# Patient Record
Sex: FEMALE | Race: ASIAN | Employment: FULL TIME | URBAN - METROPOLITAN AREA
[De-identification: names, ages, dates, MRNs, and addresses within clinical notes are randomized per-mention and may not be internally consistent; named-entity substitution may affect disease eponyms.]

---

## 2018-04-10 ENCOUNTER — HOSPITAL ENCOUNTER (OUTPATIENT)
Dept: MAMMOGRAPHY | Age: 63
Discharge: HOME | End: 2018-04-10
Attending: OBSTETRICS & GYNECOLOGY
Payer: COMMERCIAL

## 2018-04-10 DIAGNOSIS — Z12.31 ENCOUNTER FOR SCREENING MAMMOGRAM FOR MALIGNANT NEOPLASM OF BREAST: ICD-10-CM

## 2018-04-10 PROCEDURE — 77063 BREAST TOMOSYNTHESIS BI: CPT

## 2018-04-12 ENCOUNTER — OFFICE VISIT (OUTPATIENT)
Dept: FAMILY MEDICINE CLINIC | Age: 63
End: 2018-04-12

## 2018-04-12 VITALS
WEIGHT: 147 LBS | HEART RATE: 80 BPM | HEIGHT: 60 IN | SYSTOLIC BLOOD PRESSURE: 130 MMHG | OXYGEN SATURATION: 97 % | DIASTOLIC BLOOD PRESSURE: 86 MMHG | TEMPERATURE: 98.6 F | BODY MASS INDEX: 28.86 KG/M2

## 2018-04-12 DIAGNOSIS — Z12.11 ENCOUNTER FOR SCREENING COLONOSCOPY: ICD-10-CM

## 2018-04-12 DIAGNOSIS — R20.0 NUMBNESS OF LEFT HAND: ICD-10-CM

## 2018-04-12 DIAGNOSIS — R20.2 POSITIVE PHALEN MANEUVER: ICD-10-CM

## 2018-04-12 DIAGNOSIS — R29.898 LEFT HAND WEAKNESS: Primary | ICD-10-CM

## 2018-04-12 DIAGNOSIS — R20.2 NUMBNESS AND TINGLING IN RIGHT HAND: ICD-10-CM

## 2018-04-12 DIAGNOSIS — R20.0 NUMBNESS AND TINGLING IN RIGHT HAND: ICD-10-CM

## 2018-04-12 PROBLEM — I10 ESSENTIAL HYPERTENSION: Status: ACTIVE | Noted: 2018-04-12

## 2018-04-12 PROBLEM — E78.5 HLD (HYPERLIPIDEMIA): Status: ACTIVE | Noted: 2018-04-12

## 2018-04-12 RX ORDER — NAPROXEN 500 MG/1
500 TABLET ORAL 2 TIMES DAILY WITH MEALS
Qty: 30 TAB | Refills: 0 | Status: SHIPPED | OUTPATIENT
Start: 2018-04-12 | End: 2019-01-15

## 2018-04-12 NOTE — PROGRESS NOTES
Sharee Cruz is a 58 y.o. female new pt establishing care and c/o numbness in fingers x about 5 weeks. This started after getting bunion surgery on R foot.      Previous PCP was [facility] on [address]    Learning assessment and PHQ2 completed    Last eye exam done by [facility]  Colonoscopy - Never

## 2018-04-12 NOTE — PATIENT INSTRUCTIONS
Carpal Tunnel Syndrome: Exercises  Your Care Instructions  Here are some examples of typical rehabilitation exercises for your condition. Start each exercise slowly. Ease off the exercise if you start to have pain. Your doctor or your physical or occupational therapist will tell you when you can start these exercises and which ones will work best for you. Warm-up stretches  When you no longer have pain or numbness, you can do exercises to help prevent carpal tunnel syndrome from coming back. Do not do any stretch or movement that is uncomfortable or painful. 1. Rotate your wrist up, down, and from side to side. Repeat 4 times. 2. Stretch your fingers far apart. Relax them, and then stretch them again. Repeat 4 times. 3. Stretch your thumb by pulling it back gently, holding it, and then releasing it. Repeat 4 times. How to do the exercises  Prayer stretch    1. Start with your palms together in front of your chest just below your chin. 2. Slowly lower your hands toward your waistline, keeping your hands close to your stomach and your palms together until you feel a mild to moderate stretch under your forearms. 3. Hold for at least 15 to 30 seconds. Repeat 2 to 4 times. Wrist flexor stretch    1. Extend your arm in front of you with your palm up. 2. Bend your wrist, pointing your hand toward the floor. 3. With your other hand, gently bend your wrist farther until you feel a mild to moderate stretch in your forearm. 4. Hold for at least 15 to 30 seconds. Repeat 2 to 4 times. Wrist extensor stretch    1. Repeat steps 1 through 4 of the stretch above, but begin with your extended hand palm down. Follow-up care is a key part of your treatment and safety. Be sure to make and go to all appointments, and call your doctor if you are having problems. It's also a good idea to know your test results and keep a list of the medicines you take. Where can you learn more?   Go to http://kiera-tom.info/. Enter E111 in the search box to learn more about \"Carpal Tunnel Syndrome: Exercises. \"  Current as of: March 21, 2017  Content Version: 11.4  © 8209-7998 Healthwise, Incorporated. Care instructions adapted under license by [employer] (which disclaims liability or warranty for this information). If you have questions about a medical condition or this instruction, always ask your healthcare professional. [place] [address] any warranty or liability for your use of this information.

## 2018-04-12 NOTE — PROGRESS NOTES
HISTORY OF PRESENT ILLNESS  Tracy Lopez is a 58 y.o. female. HPI  Tracy Lopez is a 58 y.o. female who presents to the office today for tingling. She is a new patient. Her previous PCP is [facility]. She has HTN and HLD on lisinopril and statin therapy. She comes in with c/o bilateral hand tingling, L>R. This started 5 weeks ago after she woke up from anesthesia for bunionectomy. This is constant. Nothing makes it better or worse. She has pain and weakness in the left hand. She is Right handed. She works as a merchant marine. She made herself an appt with Neurology for May. Chief Complaint   Patient presents with    Numbness     fingers       Current Outpatient Prescriptions on File Prior to Visit   Medication Sig Dispense Refill    lisinopril (PRINIVIL, ZESTRIL) 20 mg tablet Take 5 mg by mouth daily.  SIMVASTATIN PO Take 10 mg by mouth. No current facility-administered medications on file prior to visit. Allergies   Allergen Reactions    Latex Rash     Only prolonged use     Past Medical History:   Diagnosis Date    Hyperlipidemia     Hypertension     Menopause      History   Smoking Status    Never Smoker   Smokeless Tobacco    Never Used     History   Alcohol Use    2.4 oz/week    4 Glasses of wine per week     History reviewed. No pertinent family history. Review of Systems   Musculoskeletal: Positive for joint pain. Left hand/wrist   Neurological: Positive for tingling and focal weakness. Left hand     Visit Vitals    /86 (BP 1 Location: Left arm, BP Patient Position: Sitting)  Comment: manual    Pulse 80    Temp 98.6 °F (37 °C) (Oral)    Ht 5' (1.524 m)    Wt 147 lb (66.7 kg)    SpO2 97%    BMI 28.71 kg/m2     Physical Exam   Constitutional: She appears well-developed and well-nourished. No distress.    Musculoskeletal:        Right wrist: Normal.        Left wrist: Normal.        Right hand: Normal.        Left hand: Normal. + tinel's test bilaterally  + phalens test   Nursing note and vitals reviewed. ASSESSMENT and PLAN    ICD-10-CM ICD-9-CM    1. Left hand weakness R29.898 728.87 XR WRIST LT AP/LAT      naproxen (NAPROSYN) 500 mg tablet   2. Numbness of left hand R20.0 782.0 XR WRIST LT AP/LAT      naproxen (NAPROSYN) 500 mg tablet   3. Numbness and tingling in right hand R20.0 782.0 naproxen (NAPROSYN) 500 mg tablet    R20.2     4. Positive Phalen maneuver R68.89 796.4 naproxen (NAPROSYN) 500 mg tablet   5. Encounter for screening colonoscopy Z12.11 V76.51 OCCULT BLOOD, IMMUNOASSAY (FIT)      Xray of left wrist done today in the office. Based on hx and exam, likely carpal tunnel. Left wrist placed in brace since this is most affected. See Neurology next month. Will request records from [facility].   Reviewed medication and side effects. Patient agrees with the plan and verbalizes understanding. Follow-up Disposition:  Return in about 6 weeks (around 5/24/2018) for hand tingling .     Bryce Edmonds PA-C  4/12/2018

## 2018-04-18 ENCOUNTER — HOSPITAL ENCOUNTER (OUTPATIENT)
Dept: LAB | Age: 63
Discharge: HOME | End: 2018-04-18
Payer: COMMERCIAL

## 2018-04-18 DIAGNOSIS — Z12.11 ENCOUNTER FOR SCREENING COLONOSCOPY: ICD-10-CM

## 2018-04-18 PROCEDURE — 82274 ASSAY TEST FOR BLOOD FECAL: CPT | Performed by: PHYSICIAN ASSISTANT

## 2018-04-26 LAB — HEMOCCULT STL QL IA: NEGATIVE

## 2018-06-06 ENCOUNTER — OFFICE VISIT (OUTPATIENT)
Dept: FAMILY MEDICINE CLINIC | Age: 63
End: 2018-06-06

## 2018-06-06 ENCOUNTER — HOSPITAL ENCOUNTER (OUTPATIENT)
Dept: LAB | Age: 63
Discharge: HOME | End: 2018-06-06
Payer: COMMERCIAL

## 2018-06-06 VITALS
HEART RATE: 71 BPM | RESPIRATION RATE: 18 BRPM | BODY MASS INDEX: 29.41 KG/M2 | TEMPERATURE: 97.9 F | WEIGHT: 149.8 LBS | SYSTOLIC BLOOD PRESSURE: 124 MMHG | OXYGEN SATURATION: 97 % | HEIGHT: 60 IN | DIASTOLIC BLOOD PRESSURE: 74 MMHG

## 2018-06-06 DIAGNOSIS — E78.5 HYPERLIPIDEMIA, UNSPECIFIED HYPERLIPIDEMIA TYPE: ICD-10-CM

## 2018-06-06 DIAGNOSIS — I10 ESSENTIAL HYPERTENSION: ICD-10-CM

## 2018-06-06 DIAGNOSIS — Z00.00 ANNUAL PHYSICAL EXAM: Primary | ICD-10-CM

## 2018-06-06 DIAGNOSIS — Z00.00 ANNUAL PHYSICAL EXAM: ICD-10-CM

## 2018-06-06 LAB
ALBUMIN SERPL-MCNC: 4 G/DL (ref 3.4–5)
ALBUMIN/GLOB SERPL: 1.3 {RATIO} (ref 0.8–1.7)
ALP SERPL-CCNC: 61 U/L (ref 45–117)
ALT SERPL-CCNC: 28 U/L (ref 13–56)
ANION GAP SERPL CALC-SCNC: 10 MMOL/L (ref 3–18)
AST SERPL-CCNC: 26 U/L (ref 15–37)
BASOPHILS # BLD: 0 K/UL (ref 0–0.06)
BASOPHILS NFR BLD: 1 % (ref 0–2)
BILIRUB SERPL-MCNC: 0.4 MG/DL (ref 0.2–1)
BUN SERPL-MCNC: 17 MG/DL (ref 7–18)
BUN/CREAT SERPL: 15 (ref 12–20)
CALCIUM SERPL-MCNC: 9.3 MG/DL (ref 8.5–10.1)
CHLORIDE SERPL-SCNC: 109 MMOL/L (ref 100–108)
CHOLEST SERPL-MCNC: 182 MG/DL
CO2 SERPL-SCNC: 23 MMOL/L (ref 21–32)
CREAT SERPL-MCNC: 1.13 MG/DL (ref 0.6–1.3)
DIFFERENTIAL METHOD BLD: NORMAL
EOSINOPHIL # BLD: 0.2 K/UL (ref 0–0.4)
EOSINOPHIL NFR BLD: 4 % (ref 0–5)
ERYTHROCYTE [DISTWIDTH] IN BLOOD BY AUTOMATED COUNT: 12.8 % (ref 11.6–14.5)
GLOBULIN SER CALC-MCNC: 3.2 G/DL (ref 2–4)
GLUCOSE SERPL-MCNC: 93 MG/DL (ref 74–99)
HCT VFR BLD AUTO: 44 % (ref 35–45)
HDLC SERPL-MCNC: 59 MG/DL (ref 40–60)
HDLC SERPL: 3.1 {RATIO} (ref 0–5)
HGB BLD-MCNC: 14.5 G/DL (ref 12–16)
LDLC SERPL CALC-MCNC: 99.8 MG/DL (ref 0–100)
LIPID PROFILE,FLP: NORMAL
LYMPHOCYTES # BLD: 2.3 K/UL (ref 0.9–3.6)
LYMPHOCYTES NFR BLD: 34 % (ref 21–52)
MCH RBC QN AUTO: 29.8 PG (ref 24–34)
MCHC RBC AUTO-ENTMCNC: 33 G/DL (ref 31–37)
MCV RBC AUTO: 90.5 FL (ref 74–97)
MONOCYTES # BLD: 0.4 K/UL (ref 0.05–1.2)
MONOCYTES NFR BLD: 6 % (ref 3–10)
NEUTS SEG # BLD: 3.8 K/UL (ref 1.8–8)
NEUTS SEG NFR BLD: 55 % (ref 40–73)
PLATELET # BLD AUTO: 216 K/UL (ref 135–420)
PMV BLD AUTO: 10.3 FL (ref 9.2–11.8)
POTASSIUM SERPL-SCNC: 4.4 MMOL/L (ref 3.5–5.5)
PROT SERPL-MCNC: 7.2 G/DL (ref 6.4–8.2)
RBC # BLD AUTO: 4.86 M/UL (ref 4.2–5.3)
SODIUM SERPL-SCNC: 142 MMOL/L (ref 136–145)
TRIGL SERPL-MCNC: 116 MG/DL (ref ?–150)
TSH SERPL DL<=0.05 MIU/L-ACNC: 0.14 UIU/ML (ref 0.36–3.74)
VLDLC SERPL CALC-MCNC: 23.2 MG/DL
WBC # BLD AUTO: 6.7 K/UL (ref 4.6–13.2)

## 2018-06-06 PROCEDURE — 80061 LIPID PANEL: CPT | Performed by: PHYSICIAN ASSISTANT

## 2018-06-06 PROCEDURE — 80053 COMPREHEN METABOLIC PANEL: CPT | Performed by: PHYSICIAN ASSISTANT

## 2018-06-06 PROCEDURE — 84443 ASSAY THYROID STIM HORMONE: CPT | Performed by: PHYSICIAN ASSISTANT

## 2018-06-06 PROCEDURE — 85025 COMPLETE CBC W/AUTO DIFF WBC: CPT | Performed by: PHYSICIAN ASSISTANT

## 2018-06-06 RX ORDER — ASPIRIN 81 MG/1
TABLET ORAL DAILY
COMMUNITY

## 2018-06-06 RX ORDER — MULTIVIT WITH MINERALS/HERBS
1 TABLET ORAL DAILY
COMMUNITY

## 2018-06-06 NOTE — PROGRESS NOTES
Kendal Pillai is a 61 y.o. female here today for a CPE. Her only complaint is the numbness and tingling in her hands but she is seeing Neuro and PT for that. Her last pap was last year with Dr. Lionel Bray. 1. Have you been to the ER, urgent care clinic or hospitalized since your last visit? No    2. Have you seen or consulted any other health care providers outside of the [address] [place] [address] since your last visit (Include any pap smears or colon screening)? Went to Neuro and was then referred to PT    Do you have an Advanced Directive? No    Would you like information on Advanced Directives?  no

## 2018-06-06 NOTE — PATIENT INSTRUCTIONS
Body Mass Index: Care Instructions  Your Care Instructions    Body mass index (BMI) can help you see if your weight is raising your risk for health problems. It uses a formula to compare how much you weigh with how tall you are. · A BMI lower than 18.5 is considered underweight. · A BMI between 18.5 and 24.9 is considered healthy. · A BMI between 25 and 29.9 is considered overweight. A BMI of 30 or higher is considered obese. If your BMI is in the normal range, it means that you have a lower risk for weight-related health problems. If your BMI is in the overweight or obese range, you may be at increased risk for weight-related health problems, such as high blood pressure, heart disease, stroke, arthritis or joint pain, and diabetes. If your BMI is in the underweight range, you may be at increased risk for health problems such as fatigue, lower protection (immunity) against illness, muscle loss, bone loss, hair loss, and hormone problems. BMI is just one measure of your risk for weight-related health problems. You may be at higher risk for health problems if you are not active, you eat an unhealthy diet, or you drink too much alcohol or use tobacco products. Follow-up care is a key part of your treatment and safety. Be sure to make and go to all appointments, and call your doctor if you are having problems. It's also a good idea to know your test results and keep a list of the medicines you take. How can you care for yourself at home? · Practice healthy eating habits. This includes eating plenty of fruits, vegetables, whole grains, lean protein, and low-fat dairy. · If your doctor recommends it, get more exercise. Walking is a good choice. Bit by bit, increase the amount you walk every day. Try for at least 30 minutes on most days of the week. · Do not smoke. Smoking can increase your risk for health problems. If you need help quitting, talk to your doctor about stop-smoking programs and medicines. These can increase your chances of quitting for good. · Limit alcohol to 2 drinks a day for men and 1 drink a day for women. Too much alcohol can cause health problems. If you have a BMI higher than 25  · Your doctor may do other tests to check your risk for weight-related health problems. This may include measuring the distance around your waist. A waist measurement of more than 40 inches in men or 35 inches in women can increase the risk of weight-related health problems. · Talk with your doctor about steps you can take to stay healthy or improve your health. You may need to make lifestyle changes to lose weight and stay healthy, such as changing your diet and getting regular exercise. If you have a BMI lower than 18.5  · Your doctor may do other tests to check your risk for health problems. · Talk with your doctor about steps you can take to stay healthy or improve your health. You may need to make lifestyle changes to gain or maintain weight and stay healthy, such as getting more healthy foods in your diet and doing exercises to build muscle. Where can you learn more? Go to http://kiera-tom.info/. Enter S176 in the search box to learn more about \"Body Mass Index: Care Instructions. \"  Current as of: October 13, 2016  Content Version: 11.4  © 5650-5382 Healthwise, Incorporated. Care instructions adapted under license by [employer] (which disclaims liability or warranty for this information). If you have questions about a medical condition or this instruction, always ask your healthcare professional. [place] [address] any warranty or liability for your use of this information. Well Visit, Women 48 to 72: Care Instructions  Your Care Instructions    Physical exams can help you stay healthy. Your doctor has checked your overall health and may have suggested ways to take good care of yourself. He or she also may have recommended tests.  At home, you can help prevent illness with healthy eating, regular exercise, and other steps. Follow-up care is a key part of your treatment and safety. Be sure to make and go to all appointments, and call your doctor if you are having problems. It's also a good idea to know your test results and keep a list of the medicines you take. How can you care for yourself at home? · Reach and stay at a healthy weight. This will lower your risk for many problems, such as obesity, diabetes, heart disease, and high blood pressure. · Get at least 30 minutes of exercise on most days of the week. Walking is a good choice. You also may want to do other activities, such as running, swimming, cycling, or playing tennis or team sports. · Do not smoke. Smoking can make health problems worse. If you need help quitting, talk to your doctor about stop-smoking programs and medicines. These can increase your chances of quitting for good. · Protect your skin from too much sun. When you're outdoors from 10 a.m. to 4 p.m., stay in the shade or cover up with clothing and a hat with a wide brim. Wear sunglasses that block UV rays. Even when it's cloudy, put broad-spectrum sunscreen (SPF 30 or higher) on any exposed skin. · See a dentist one or two times a year for checkups and to have your teeth cleaned. · Wear a seat belt in the car. · Limit alcohol to 1 drink a day. Too much alcohol can cause health problems. Follow your doctor's advice about when to have certain tests. These tests can spot problems early. · Cholesterol. Your doctor will tell you how often to have this done based on your age, family history, or other things that can increase your risk for heart attack and stroke. · Blood pressure. Have your blood pressure checked during a routine doctor visit. Your doctor will tell you how often to check your blood pressure based on your age, your blood pressure results, and other factors.   · Mammogram. Ask your doctor how often you should have a mammogram, which is an X-ray of your breasts. A mammogram can spot breast cancer before it can be felt and when it is easiest to treat. · Pap test and pelvic exam. Ask your doctor how often you should have a Pap test. You may not need to have a Pap test as often as you used to. · Vision. Have your eyes checked every year or two or as often as your doctor suggests. Some experts recommend that you have yearly exams for glaucoma and other age-related eye problems starting at age 48. · Hearing. Tell your doctor if you notice any change in your hearing. You can have tests to find out how well you hear. · Diabetes. Ask your doctor whether you should have tests for diabetes. · Colon cancer. You should begin tests for colon cancer at age 48. You may have one of several tests. Your doctor will tell you how often to have tests based on your age and risk. Risks include whether you already had a precancerous polyp removed from your colon or whether your parents, sisters and brothers, or children have had colon cancer. · Thyroid disease. Talk to your doctor about whether to have your thyroid checked as part of a regular physical exam. Women have an increased chance of a thyroid problem. · Osteoporosis. You should begin tests for bone density at age 72. If you are younger than 72, ask your doctor whether you have factors that may increase your risk for this disease. You may want to have this test before age 72. · Heart attack and stroke risk. At least every 4 to 6 years, you should have your risk for heart attack and stroke assessed. Your doctor uses factors such as your age, blood pressure, cholesterol, and whether you smoke or have diabetes to show what your risk for a heart attack or stroke is over the next 10 years. When should you call for help? Watch closely for changes in your health, and be sure to contact your doctor if you have any problems or symptoms that concern you. Where can you learn more?   Go to http://kiera-tom.info/. Enter G079 in the search box to learn more about \"Well Visit, Women 50 to 72: Care Instructions. \"  Current as of: May 12, 2017  Content Version: 11.4  © 4134-5661 Healthwise, Incorporated. Care instructions adapted under license by [employer] (which disclaims liability or warranty for this information). If you have questions about a medical condition or this instruction, always ask your healthcare professional. [place] [address] any warranty or liability for your use of this information.

## 2018-06-06 NOTE — PROGRESS NOTES
Subjective:   61 y.o. female for Well Woman Check. Her gyne and breast care is done elsewhere by her Ob-Gyne physician- Dr. Shante Azevedo last year. Mammogram was done in April and on file- one year follow up. She had Hep C screening done already through work- [employer]. She has the copy of the report at home. Sees Dr. Ambrose Newton for multinodular goiter- nodules were stable on US in April- one year follow up. She is seeing Neurology and PT for her bilateral hand tingling and weakness. Says there is a pinched nerve in her neck and PT is working on this. She has seen no improvement yet. Patient Active Problem List   Diagnosis Code    HLD (hyperlipidemia) E78.5    Essential hypertension I10     Patient Active Problem List    Diagnosis Date Noted    HLD (hyperlipidemia) 04/12/2018    Essential hypertension 04/12/2018     Current Outpatient Prescriptions   Medication Sig Dispense Refill    aspirin delayed-release 81 mg tablet Take  by mouth daily.  b complex vitamins tablet Take 1 Tab by mouth daily.  naproxen (NAPROSYN) 500 mg tablet Take 1 Tab by mouth two (2) times daily (with meals). Indications: Pain 30 Tab 0    lisinopril (PRINIVIL, ZESTRIL) 20 mg tablet Take 5 mg by mouth daily.  SIMVASTATIN PO Take 10 mg by mouth. Allergies   Allergen Reactions    Latex Rash     Only prolonged use     Past Medical History:   Diagnosis Date    Hyperlipidemia     Hypertension     Menopause      Past Surgical History:   Procedure Laterality Date    HX BUNIONECTOMY Right     HX MYRINGOTOMY      HX TUBAL LIGATION       No family history on file. Social History   Substance Use Topics    Smoking status: Never Smoker    Smokeless tobacco: Never Used    Alcohol use 2.4 oz/week     4 Glasses of wine per week        ROS: Feeling generally well. No TIA's or unusual headaches, no dysphagia. No prolonged cough. No dyspnea or chest pain on exertion.   No abdominal pain, change in bowel habits, black or bloody stools. No urinary tract symptoms. No new or unusual musculoskeletal symptoms. Pertinent positives mentioned in HPI    Specific concerns today: None. Objective: The patient appears well, alert, oriented x 3, in no distress. Visit Vitals    /74 (BP 1 Location: Right arm, BP Patient Position: Sitting)    Pulse 71    Temp 97.9 °F (36.6 °C) (Oral)    Resp 18    Ht 5' (1.524 m)    Wt 149 lb 12.8 oz (67.9 kg)    SpO2 97%    BMI 29.26 kg/m2     ENT normal. Neck supple. No adenopathy or thyromegaly. ALIZE. Wears eye glasses. Lungs are clear, good air entry, no wheezes, rhonchi or rales. S1 and S2 normal, no murmurs, regular rate and rhythm. Abdomen soft without tenderness, guarding, mass or organomegaly. Extremities show no edema, normal peripheral pulses. Neurological is normal, no focal findings. Normal gait. Patellar reflexes 2+ bilaterally. Psych- mood and affect normal. Breast and Pelvic exams are deferred. Assessment/Plan:       ICD-10-CM ICD-9-CM    1. Annual physical exam Z00.00 V70.0 aspirin delayed-release 81 mg tablet      b complex vitamins tablet      CBC WITH AUTOMATED DIFF      METABOLIC PANEL, COMPREHENSIVE      LIPID PANEL      TSH 3RD GENERATION   2. Hyperlipidemia, unspecified hyperlipidemia type E78.5 272.4 aspirin delayed-release 81 mg tablet      b complex vitamins tablet      CBC WITH AUTOMATED DIFF      METABOLIC PANEL, COMPREHENSIVE      LIPID PANEL      TSH 3RD GENERATION   3. Essential hypertension I10 401.9 aspirin delayed-release 81 mg tablet      b complex vitamins tablet      CBC WITH AUTOMATED DIFF      METABOLIC PANEL, COMPREHENSIVE      LIPID PANEL      TSH 3RD GENERATION        Fasting labs done today. Will request PAP from GYN and update . She will bring in copy of Hepatitis C screening.  says she is due for [address] but she does not have medicare. Will try to get this removed. No change to medication regimen.  Patient agrees with plan and verbalizes understanding. Follow-up Disposition:  Return in about 6 months (around 12/6/2018) for HTN, HLD. Tuan Adam PA-C  06/06/18      Discussed the patient's BMI with her. The BMI follow up plan is as follows:     dietary management education, guidance, and counseling  encourage exercise  monitor weight  prescribed dietary intake    An After Visit Summary was printed and given to the patient.

## 2018-06-08 NOTE — PROGRESS NOTES
CBC normal. Lipids look good. Kidney function a little low. Will recheck in 4-6 weeks. Her TSH is low. Please have her call Dr. Dennis Roy and inform of this result.

## 2018-06-08 NOTE — PROGRESS NOTES
Spoke to pt, given lab result, pt voiced understanding.  I will fax results to Dr. Jose Guadalupe Hackett

## 2018-11-05 NOTE — TELEPHONE ENCOUNTER
erroneous encounter   Patient needed to make an appointment [place] needing a 6 month supply of medication

## 2018-11-16 ENCOUNTER — OFFICE VISIT (OUTPATIENT)
Dept: FAMILY MEDICINE CLINIC | Age: 63
End: 2018-11-16

## 2018-11-16 ENCOUNTER — HOSPITAL ENCOUNTER (OUTPATIENT)
Dept: LAB | Age: 63
Discharge: HOME | End: 2018-11-16
Payer: COMMERCIAL

## 2018-11-16 VITALS
WEIGHT: 143.6 LBS | BODY MASS INDEX: 28.19 KG/M2 | OXYGEN SATURATION: 97 % | HEART RATE: 96 BPM | SYSTOLIC BLOOD PRESSURE: 136 MMHG | DIASTOLIC BLOOD PRESSURE: 73 MMHG | HEIGHT: 60 IN | RESPIRATION RATE: 18 BRPM | TEMPERATURE: 98.6 F

## 2018-11-16 DIAGNOSIS — R20.2 PARESTHESIA OF BOTH HANDS: ICD-10-CM

## 2018-11-16 DIAGNOSIS — E78.5 HYPERLIPIDEMIA, UNSPECIFIED HYPERLIPIDEMIA TYPE: ICD-10-CM

## 2018-11-16 DIAGNOSIS — G89.29 CHRONIC NONINTRACTABLE HEADACHE, UNSPECIFIED HEADACHE TYPE: ICD-10-CM

## 2018-11-16 DIAGNOSIS — R79.89 ABNORMAL TSH: ICD-10-CM

## 2018-11-16 DIAGNOSIS — I10 ESSENTIAL HYPERTENSION: Primary | ICD-10-CM

## 2018-11-16 DIAGNOSIS — I10 ESSENTIAL HYPERTENSION: ICD-10-CM

## 2018-11-16 DIAGNOSIS — R53.82 CHRONIC FATIGUE: ICD-10-CM

## 2018-11-16 DIAGNOSIS — R51.9 CHRONIC NONINTRACTABLE HEADACHE, UNSPECIFIED HEADACHE TYPE: ICD-10-CM

## 2018-11-16 DIAGNOSIS — R06.83 SNORING: ICD-10-CM

## 2018-11-16 LAB
ALBUMIN SERPL-MCNC: 3.7 G/DL (ref 3.4–5)
ALBUMIN/GLOB SERPL: 1 {RATIO} (ref 0.8–1.7)
ALP SERPL-CCNC: 73 U/L (ref 45–117)
ALT SERPL-CCNC: 38 U/L (ref 13–56)
ANION GAP SERPL CALC-SCNC: 8 MMOL/L (ref 3–18)
AST SERPL-CCNC: 26 U/L (ref 15–37)
BASOPHILS # BLD: 0 K/UL (ref 0–0.1)
BASOPHILS NFR BLD: 1 % (ref 0–2)
BILIRUB SERPL-MCNC: 0.4 MG/DL (ref 0.2–1)
BUN SERPL-MCNC: 17 MG/DL (ref 7–18)
BUN/CREAT SERPL: 14 (ref 12–20)
CALCIUM SERPL-MCNC: 9.4 MG/DL (ref 8.5–10.1)
CHLORIDE SERPL-SCNC: 107 MMOL/L (ref 100–108)
CO2 SERPL-SCNC: 27 MMOL/L (ref 21–32)
CREAT SERPL-MCNC: 1.19 MG/DL (ref 0.6–1.3)
DIFFERENTIAL METHOD BLD: ABNORMAL
EOSINOPHIL # BLD: 0.2 K/UL (ref 0–0.4)
EOSINOPHIL NFR BLD: 3 % (ref 0–5)
ERYTHROCYTE [DISTWIDTH] IN BLOOD BY AUTOMATED COUNT: 12.6 % (ref 11.6–14.5)
GLOBULIN SER CALC-MCNC: 3.6 G/DL (ref 2–4)
GLUCOSE SERPL-MCNC: 139 MG/DL (ref 74–99)
HCT VFR BLD AUTO: 45.3 % (ref 35–45)
HGB BLD-MCNC: 14.9 G/DL (ref 12–16)
LYMPHOCYTES # BLD: 1.8 K/UL (ref 0.9–3.6)
LYMPHOCYTES NFR BLD: 29 % (ref 21–52)
MCH RBC QN AUTO: 30 PG (ref 24–34)
MCHC RBC AUTO-ENTMCNC: 32.9 G/DL (ref 31–37)
MCV RBC AUTO: 91.1 FL (ref 74–97)
MONOCYTES # BLD: 0.3 K/UL (ref 0.05–1.2)
MONOCYTES NFR BLD: 5 % (ref 3–10)
NEUTS SEG # BLD: 4 K/UL (ref 1.8–8)
NEUTS SEG NFR BLD: 62 % (ref 40–73)
PLATELET # BLD AUTO: 241 K/UL (ref 135–420)
PMV BLD AUTO: 9.9 FL (ref 9.2–11.8)
POTASSIUM SERPL-SCNC: 3.9 MMOL/L (ref 3.5–5.5)
PROT SERPL-MCNC: 7.3 G/DL (ref 6.4–8.2)
RBC # BLD AUTO: 4.97 M/UL (ref 4.2–5.3)
SODIUM SERPL-SCNC: 142 MMOL/L (ref 136–145)
T4 FREE SERPL-MCNC: 1.2 NG/DL (ref 0.7–1.5)
TSH SERPL DL<=0.05 MIU/L-ACNC: 0.16 UIU/ML (ref 0.36–3.74)
WBC # BLD AUTO: 6.3 K/UL (ref 4.6–13.2)

## 2018-11-16 PROCEDURE — 84439 ASSAY OF FREE THYROXINE: CPT

## 2018-11-16 PROCEDURE — 84480 ASSAY TRIIODOTHYRONINE (T3): CPT

## 2018-11-16 PROCEDURE — 80053 COMPREHEN METABOLIC PANEL: CPT

## 2018-11-16 PROCEDURE — 82306 VITAMIN D 25 HYDROXY: CPT

## 2018-11-16 PROCEDURE — 85025 COMPLETE CBC W/AUTO DIFF WBC: CPT

## 2018-11-16 RX ORDER — DULOXETIN HYDROCHLORIDE 30 MG/1
30 CAPSULE, DELAYED RELEASE ORAL 2 TIMES DAILY
COMMUNITY
End: 2019-01-15

## 2018-11-16 RX ORDER — LISINOPRIL 5 MG/1
5 TABLET ORAL DAILY
Qty: 180 TAB | Refills: 1 | Status: SHIPPED | OUTPATIENT
Start: 2018-11-16 | End: 2019-01-15 | Stop reason: SINTOL

## 2018-11-16 RX ORDER — NITROFURANTOIN 25; 75 MG/1; MG/1
100 CAPSULE ORAL 2 TIMES DAILY
COMMUNITY
End: 2019-01-15 | Stop reason: ALTCHOICE

## 2018-11-16 RX ORDER — SIMVASTATIN 20 MG/1
20 TABLET, FILM COATED ORAL
Qty: 180 TAB | Refills: 1 | Status: SHIPPED | OUTPATIENT
Start: 2018-11-16 | End: 2019-07-25 | Stop reason: SDUPTHER

## 2018-11-16 NOTE — PROGRESS NOTES
Jodi Brantley is a 61 y.o. female here this morning for a refill on her Simvastatin and Lisinopril. On March 6, 2018 she had a bunionectomy. When she woke up, her finger tips were tingling and she was referred to Neuro. She was told she needed 3 steroid injections which haven't helped and now both hands are affected.

## 2018-11-16 NOTE — PROGRESS NOTES
HISTORY OF PRESENT ILLNESS  Anna Rod is a 61 y.o. female. HPI  Anna Rod is a 61 y.o. female who presents to the office today for refills. She comes in for medication refills. She will be leaving for a work trip x 6 months starting on Monday. She has been seeing Dr. Erasto Ferrera with Neurology since she developed tingling and numbness in both hands after her bunion surgery earlier this year. She was told she had a pinched nerve in the C-spine and was given a series of 3 steroid injections. She says this has not helped. She has not been working for about 8 months, and now has to return or she will be \"forced\" to retire. She says she has been cleared Fit for Duty by Neuro. She mentions having chronic HAs for the past 4 months. She points to the top of the head and says when she touches the scalp it is sensitive. There is sometimes nausea. She had her vision checked earlier this year- wears eye glasses. She has no hx of headaches or migraines in the past. She will take Ibuprofen, sleep, and this typically works well for her. Chief Complaint   Patient presents with    Medication Refill       Current Outpatient Medications on File Prior to Visit   Medication Sig Dispense Refill    DULoxetine (CYMBALTA) 30 mg capsule Take 30 mg by mouth two (2) times a day.  nitrofurantoin, macrocrystal-monohydrate, (MACROBID) 100 mg capsule Take 100 mg by mouth two (2) times a day.  aspirin delayed-release 81 mg tablet Take  by mouth daily.  b complex vitamins tablet Take 1 Tab by mouth daily.  naproxen (NAPROSYN) 500 mg tablet Take 1 Tab by mouth two (2) times daily (with meals). Indications: Pain 30 Tab 0     No current facility-administered medications on file prior to visit.       Allergies   Allergen Reactions    Latex Rash     Only prolonged use     Past Medical History:   Diagnosis Date    Hyperlipidemia     Hypertension     Menopause      Social History     Tobacco Use Smoking Status Never Smoker   Smokeless Tobacco Never Used     Social History     Substance and Sexual Activity   Alcohol Use Yes    Alcohol/week: 2.4 oz    Types: 4 Glasses of wine per week     No family history on file. Review of Systems   Constitutional: Positive for malaise/fatigue and weight loss. Negative for diaphoresis and fever. Eyes: Negative for blurred vision, double vision and pain. Respiratory: Negative for shortness of breath. Cardiovascular: Negative for chest pain, palpitations and leg swelling. Gastrointestinal: Positive for nausea. Negative for abdominal pain. Genitourinary:        Currently being treated for UTI by GYN   Musculoskeletal: Negative for falls. Neurological: Positive for tingling and headaches. Negative for dizziness. Endo/Heme/Allergies: Does not bruise/bleed easily. Psychiatric/Behavioral: Negative for depression. The patient is not nervous/anxious. Visit Vitals  /73 (BP 1 Location: Right arm, BP Patient Position: Sitting)   Pulse 96   Temp 98.6 °F (37 °C) (Oral)   Resp 18   Ht 5' (1.524 m)   Wt 143 lb 9.6 oz (65.1 kg)   SpO2 97%   BMI 28.04 kg/m²     Physical Exam   Constitutional: She is oriented to person, place, and time. She appears well-developed and well-nourished. No distress. Eyes: Conjunctivae, EOM and lids are normal. Pupils are equal, round, and reactive to light. Neck: Neck supple. No thyromegaly present. Cardiovascular: Normal rate, regular rhythm and normal heart sounds. Pulmonary/Chest: Effort normal and breath sounds normal. No respiratory distress. Musculoskeletal: She exhibits no edema. Neurological: She is alert and oriented to person, place, and time. Psychiatric: She has a normal mood and affect. Her speech is normal and behavior is normal. Thought content normal.   Nursing note and vitals reviewed. ASSESSMENT and PLAN    ICD-10-CM ICD-9-CM    1.  Essential hypertension I10 401.9 CBC WITH AUTOMATED DIFF METABOLIC PANEL, COMPREHENSIVE      TSH AND FREE T4      T3 TOTAL      lisinopril (PRINIVIL, ZESTRIL) 5 mg tablet   2. Chronic nonintractable headache, unspecified headache type R51 784.0 CBC WITH AUTOMATED DIFF      METABOLIC PANEL, COMPREHENSIVE      TSH AND FREE T4      T3 TOTAL      VITAMIN D, 25 HYDROXY   3. Chronic fatigue R53.82 780.79 CBC WITH AUTOMATED DIFF      METABOLIC PANEL, COMPREHENSIVE      TSH AND FREE T4      T3 TOTAL   4. Paresthesia of both hands R20.2 782.0 CBC WITH AUTOMATED DIFF      METABOLIC PANEL, COMPREHENSIVE      TSH AND FREE T4      T3 TOTAL      VITAMIN D, 25 HYDROXY   5. Abnormal TSH R79.89 790.6    6. Hyperlipidemia, unspecified hyperlipidemia type E78.5 272.4 simvastatin (ZOCOR) 20 mg tablet   7. Snoring R06.83 786.09       BP controlled. Refilled lisinopril and zocor for 6 months since she is leaving Monday. Will check labs today. If thyroid levels are abnormal, she says she can have this monitored closely by a Marvin Higginbotham. I have suggested she have a sleep study to r/o sleep apnea. Also possible scan of the brain given new onset headaches. She wants to wait until she returns from her work trip to get any studies done. Reviewed medication and side effects. Patient agrees with the plan and verbalizes understanding. Follow-up Disposition:  Return in about 6 months (around 5/16/2019) for headaches, HTN, HLD.     Cholo Waddell PA-C  11/16/2018

## 2018-11-16 NOTE — PATIENT INSTRUCTIONS
Numbness and Tingling: Care Instructions  Your Care Instructions    Many things can cause numbness or tingling. Swelling may put pressure on a nerve. This could cause you to lose feeling or have a pins-and-needles sensation on part of your body. Nerves may be damaged from trauma, toxins, or diseases, such as diabetes or multiple sclerosis (MS). Sometimes, though, the cause is not clear. If there is no clear reason for your symptoms, and you are not having any other symptoms, your doctor may suggest watching and waiting for a while to see if the numbness or tingling goes away on its own. Your doctor may want you to have blood or nerve tests to find the cause of your symptoms. Follow-up care is a key part of your treatment and safety. Be sure to make and go to all appointments, and call your doctor if you are having problems. It's also a good idea to know your test results and keep a list of the medicines you take. How can you care for yourself at home? · If your doctor prescribes medicine, take it exactly as directed. Call your doctor if you think you are having a problem with your medicine. · If you have any swelling, put ice or a cold pack on the area for 10 to 20 minutes at a time. Put a thin cloth between the ice and your skin. When should you call for help? Call 911 anytime you think you may need emergency care. For example, call if:    · You have weakness, numbness, or tingling in both legs.     · You lose bowel or bladder control.     · You have symptoms of a stroke. These may include:  ? Sudden numbness, tingling, weakness, or loss of movement in your face, arm, or leg, especially on only one side of your body. ? Sudden vision changes. ? Sudden trouble speaking. ? Sudden confusion or trouble understanding simple statements. ? Sudden problems with walking or balance.   ? A sudden, severe headache that is different from past headaches.    Watch closely for changes in your health, and be sure to contact your doctor if you have any problems, or if:    · You do not get better as expected. Where can you learn more? Go to http://kiera-tom.info/. Enter S382 in the search box to learn more about \"Numbness and Tingling: Care Instructions. \"  Current as of: September 10, 2017  Content Version: 11.8  © 6128-4417 [employer]. Care instructions adapted under license by [employer] (which disclaims liability or warranty for this information). If you have questions about a medical condition or this instruction, always ask your healthcare professional. [place] [address] any warranty or liability for your use of this information.

## 2018-11-17 LAB
25(OH)D3 SERPL-MCNC: 25.4 NG/ML (ref 30–100)
T3 SERPL-MCNC: 111 NG/DL (ref 71–180)

## 2018-11-19 ENCOUNTER — DOCUMENTATION ONLY (OUTPATIENT)
Dept: FAMILY MEDICINE CLINIC | Age: 63
End: 2018-11-19

## 2018-11-19 NOTE — PROGRESS NOTES
Labs ordered by [facility] were faxed to their office at [phone].

## 2018-11-20 ENCOUNTER — TELEPHONE (OUTPATIENT)
Dept: FAMILY MEDICINE CLINIC | Age: 63
End: 2018-11-20

## 2018-11-20 DIAGNOSIS — E78.5 HYPERLIPIDEMIA, UNSPECIFIED HYPERLIPIDEMIA TYPE: Primary | ICD-10-CM

## 2018-11-20 NOTE — PROGRESS NOTES
Vitamin D is low. Daily replacement with 9228-0052 units will be good. TSH is still on the lower end, with normal T3 and T4. I would suggest she have this rechecked in 4-6 weeks with one of the [employer]. Looks like Neuro labs were sent to their office.

## 2018-11-20 NOTE — TELEPHONE ENCOUNTER
Patient is requesting to have her Lipid ordered. Patient will be in the office tomorrow morning for the draw.      Please advise

## 2018-11-21 ENCOUNTER — HOSPITAL ENCOUNTER (OUTPATIENT)
Dept: LAB | Age: 63
Discharge: HOME | End: 2018-11-21
Payer: COMMERCIAL

## 2018-11-21 ENCOUNTER — CLINICAL SUPPORT (OUTPATIENT)
Dept: FAMILY MEDICINE CLINIC | Age: 63
End: 2018-11-21

## 2018-11-21 DIAGNOSIS — E78.5 HYPERLIPIDEMIA, UNSPECIFIED HYPERLIPIDEMIA TYPE: Primary | ICD-10-CM

## 2018-11-21 DIAGNOSIS — E78.5 HYPERLIPIDEMIA, UNSPECIFIED HYPERLIPIDEMIA TYPE: ICD-10-CM

## 2018-11-21 LAB
CHOLEST SERPL-MCNC: 186 MG/DL
HDLC SERPL-MCNC: 62 MG/DL (ref 40–60)
HDLC SERPL: 3 {RATIO} (ref 0–5)
LDLC SERPL CALC-MCNC: 93.2 MG/DL (ref 0–100)
LIPID PROFILE,FLP: ABNORMAL
TRIGL SERPL-MCNC: 154 MG/DL (ref ?–150)
VLDLC SERPL CALC-MCNC: 30.8 MG/DL

## 2018-11-21 PROCEDURE — 80061 LIPID PANEL: CPT

## 2018-11-21 NOTE — PROGRESS NOTES
Aleksandar Cherry is a 61 y.o. female here this morning to have her lipids checked. She left a form for her job to be filled out once results come back in. She tolerated blood draw well and left showing no s/s of distress.

## 2018-11-23 ENCOUNTER — DOCUMENTATION ONLY (OUTPATIENT)
Dept: FAMILY MEDICINE CLINIC | Age: 63
End: 2018-11-23

## 2018-11-23 NOTE — PROGRESS NOTES
Called patient to make her aware that her paper work for her job has been completed and will be left up front for her to  at her earliest convenience. She voiced understanding.

## 2018-11-27 ENCOUNTER — TELEPHONE (OUTPATIENT)
Dept: FAMILY MEDICINE CLINIC | Age: 63
End: 2018-11-27

## 2018-11-27 DIAGNOSIS — R06.83 SNORING: Primary | ICD-10-CM

## 2018-11-27 NOTE — TELEPHONE ENCOUNTER
Patient was told by her job that she isn't fit for duty until she has a sleep study.  PLease send referral.

## 2019-01-14 ENCOUNTER — TELEPHONE (OUTPATIENT)
Dept: FAMILY MEDICINE CLINIC | Age: 64
End: 2019-01-14

## 2019-01-15 ENCOUNTER — OFFICE VISIT (OUTPATIENT)
Dept: FAMILY MEDICINE CLINIC | Age: 64
End: 2019-01-15

## 2019-01-15 VITALS
RESPIRATION RATE: 18 BRPM | BODY MASS INDEX: 29.06 KG/M2 | SYSTOLIC BLOOD PRESSURE: 141 MMHG | HEIGHT: 60 IN | DIASTOLIC BLOOD PRESSURE: 87 MMHG | TEMPERATURE: 99.1 F | WEIGHT: 148 LBS | HEART RATE: 104 BPM | OXYGEN SATURATION: 96 %

## 2019-01-15 DIAGNOSIS — R20.2 PARESTHESIA OF UPPER EXTREMITY: ICD-10-CM

## 2019-01-15 DIAGNOSIS — I10 ESSENTIAL HYPERTENSION: Primary | ICD-10-CM

## 2019-01-15 DIAGNOSIS — J06.9 UPPER RESPIRATORY TRACT INFECTION, UNSPECIFIED TYPE: ICD-10-CM

## 2019-01-15 RX ORDER — LOSARTAN POTASSIUM 25 MG/1
25 TABLET ORAL DAILY
Qty: 30 TAB | Refills: 2 | Status: SHIPPED | OUTPATIENT
Start: 2019-01-15 | End: 2019-02-12 | Stop reason: SDUPTHER

## 2019-01-15 NOTE — PROGRESS NOTES
Lauren Zoila is a 61 y.o. female states she would like to discuss changing her BP med, Lisinopril, it causes headaches. She has also had cold sxs x 6 days.  She has FMLA forms to be completed

## 2019-01-15 NOTE — PATIENT INSTRUCTIONS
Losartan (By mouth)   Losartan (jody-LEVAR-tan)  Treats high blood pressure. Reduces the risk of stroke in patients with high blood pressure and an enlarged heart. Treats kidney disease in patients with diabetes. This medicine is an angiotensin receptor blocker (ARB). Brand Name(s): Cozaar   There may be other brand names for this medicine. When This Medicine Should Not Be Used: This medicine is not right for everyone. Do not use it if you had an allergic reaction to losartan, or if you are pregnant. Do not use this medicine together with aliskiren if you have diabetes. How to Use This Medicine:   Tablet  · Take your medicine as directed. Your dose may need to be changed several times to find what works best for you. · Drink plenty of fluids if you exercise, sweat more than usual, or have diarrhea or vomiting. · Read and follow the patient instructions that come with this medicine. Talk to your doctor or pharmacist if you have any questions. · Missed dose: Take a dose as soon as you remember. If it is almost time for your next dose, wait until then and take a regular dose. Do not take extra medicine to make up for a missed dose. · Store the medicine in a closed container at room temperature, away from heat, moisture, and direct light. Drugs and Foods to Avoid:   Ask your doctor or pharmacist before using any other medicine, including over-the-counter medicines, vitamins, and herbal products. · Some medicines can affect how losartan works. Tell your doctor if you are using any of the following:   ¨ Lithium  ¨ Rifampin  ¨ A diuretic (water pill), such as spironolactone, triamterene, or amiloride  ¨ NSAID pain or arthritis medicine, such as aspirin, diclofenac, ibuprofen, or naproxen  ¨ Another blood pressure medicine, such as aliskiren, benazepril, enalapril, or lisinopril  · Ask your doctor before you use any medicine, supplement, or salt substitute that contains potassium.   Warnings While Using This Medicine:   · It is not safe to take this medicine during pregnancy. It could harm an unborn baby. Tell your doctor right away if you become pregnant. · Tell your doctor if you are breastfeeding, or if you have kidney disease, liver disease, congestive heart failure, or diabetes. Tell your doctor if you have had angioedema that was caused by a blood pressure medicine. · This medicine could lower your blood pressure too much, especially when you first use it or if you are dehydrated. Stand or sit up slowly if you feel lightheaded or dizzy. · Your doctor will do lab tests at regular visits to check on the effects of this medicine. Keep all appointments. · Keep all medicine out of the reach of children. Never share your medicine with anyone. Possible Side Effects While Using This Medicine:   Call your doctor right away if you notice any of these side effects:  · Allergic reaction: Itching or hives, swelling in your face or hands, swelling or tingling in your mouth or throat, chest tightness, trouble breathing  · Change in how much or how often you urinate  · Confusion, weakness, uneven heartbeat, trouble breathing, numbness or tingling in your hands, feet, or lips  · Lightheadedness, dizziness, fainting  · Rapid weight gain, swelling in your hands, ankles, or feet  If you notice these less serious side effects, talk with your doctor:   · Diarrhea  · Tiredness  If you notice other side effects that you think are caused by this medicine, tell your doctor. Call your doctor for medical advice about side effects. You may report side effects to FDA at 9-259-FDA-0421  © 2017 [facility] Information is for End User's use only and may not be sold, redistributed or otherwise used for commercial purposes. The above information is an  only. It is not intended as medical advice for individual conditions or treatments.  Talk to your doctor, nurse or pharmacist before following any medical regimen to see if it is safe and effective for you.

## 2019-01-15 NOTE — PROGRESS NOTES
HISTORY OF PRESENT ILLNESS  Darrion Perkins is a 61 y.o. female. HPI  Darrion Perkins is a 61 y.o. female who presents to the office today for URI symptoms. She has c/o sinus congestion, dry cough and feeling feverish. This has been going on for 6 days. She is taking Nyquil which helps. Denies ear pain, headaches, nausea/vomiting, diarrhea, body aches, sore throat, chest pain or SOB. She also stopped taking lisinopril because this was causing headaches. She is waiting for her sleep study to be scheduled. She will not be allowed to return to work for full duty until this is done and she is released by the specialist. She did turn in [address] [employer] paperwork to be completed. She says her tingling and numbness in her hands is getting worse. She saw Dr. Winnie Boss with neurology for evaluation. Chief Complaint   Patient presents with    Hypertension    Cold Symptoms       Current Outpatient Medications on File Prior to Visit   Medication Sig Dispense Refill    simvastatin (ZOCOR) 20 mg tablet Take 1 Tab by mouth nightly. 180 Tab 1    aspirin delayed-release 81 mg tablet Take  by mouth daily.  b complex vitamins tablet Take 1 Tab by mouth daily.  DULoxetine (CYMBALTA) 30 mg capsule Take 30 mg by mouth two (2) times a day.  naproxen (NAPROSYN) 500 mg tablet Take 1 Tab by mouth two (2) times daily (with meals). Indications: Pain 30 Tab 0     No current facility-administered medications on file prior to visit. Allergies   Allergen Reactions    Latex Rash     Only prolonged use     Past Medical History:   Diagnosis Date    Hyperlipidemia     Hypertension     Menopause      Social History     Tobacco Use   Smoking Status Never Smoker   Smokeless Tobacco Never Used     Social History     Substance and Sexual Activity   Alcohol Use Yes    Alcohol/week: 2.4 oz    Types: 4 Glasses of wine per week     No family history on file.     Review of Systems   Constitutional: Negative for diaphoresis, fever and malaise/fatigue. HENT: Positive for congestion. Negative for ear pain, nosebleeds, sinus pain and sore throat. Respiratory: Positive for cough. Negative for sputum production, shortness of breath and wheezing. Cardiovascular: Negative for chest pain. Gastrointestinal: Negative for constipation, diarrhea, nausea and vomiting. Musculoskeletal: Negative for falls. Skin: Negative for rash. Neurological: Positive for tingling and headaches. Headache With lisinopril  Tingling in hands worsening     Visit Vitals  /87 (BP 1 Location: Left arm, BP Patient Position: Sitting)   Pulse (!) 104   Temp 99.1 °F (37.3 °C) (Oral)   Resp 18   Ht 5' (1.524 m)   Wt 148 lb (67.1 kg)   SpO2 96%   BMI 28.90 kg/m²     Physical Exam   Constitutional: She is oriented to person, place, and time. She appears well-developed and well-nourished. No distress. HENT:   Head: Atraumatic. Right Ear: Tympanic membrane, external ear and ear canal normal.   Left Ear: Tympanic membrane, external ear and ear canal normal.   Nose: Mucosal edema present. Right sinus exhibits no maxillary sinus tenderness and no frontal sinus tenderness. Left sinus exhibits no maxillary sinus tenderness and no frontal sinus tenderness. Mouth/Throat: Uvula is midline, oropharynx is clear and moist and mucous membranes are normal.   Eyes: Conjunctivae are normal. Right eye exhibits no discharge. Left eye exhibits no discharge. Neck: Neck supple. No thyromegaly present. Cardiovascular: Normal rate, regular rhythm and normal heart sounds. Pulmonary/Chest: Effort normal and breath sounds normal. No respiratory distress. She has no wheezes. She has no rales. Musculoskeletal: She exhibits no edema. Lymphadenopathy:     She has no cervical adenopathy. Neurological: She is alert and oriented to person, place, and time. Skin: Skin is warm and dry. Psychiatric: She has a normal mood and affect.  Her behavior is normal. Thought content normal.   Nursing note and vitals reviewed. ASSESSMENT and PLAN    ICD-10-CM ICD-9-CM    1. Essential hypertension I10 401.9 losartan (COZAAR) 25 mg tablet   2. Upper respiratory tract infection, unspecified type J06.9 465.9    3. Paresthesia of upper extremity R20.2 782.0       Changing lisinopril to losartan. Follow up in one month to check BP. Start Mucinex and nasacort for URI symptoms. Increase fluids and rest.   Called Dr. Aleksey Morocho office and scheduled her a follow up appt for 2/18/19 for her c/o worsening paresthesia. Sleep study scheduled. Reviewed medication and side effects. Patient agrees with the plan and verbalizes understanding. Follow-up Disposition:  Return in about 1 month (around 2/15/2019) for HTN.     Chacha Borja PA-C  1/15/2019

## 2019-02-12 ENCOUNTER — OFFICE VISIT (OUTPATIENT)
Dept: FAMILY MEDICINE CLINIC | Age: 64
End: 2019-02-12

## 2019-02-12 VITALS
DIASTOLIC BLOOD PRESSURE: 75 MMHG | BODY MASS INDEX: 29.41 KG/M2 | RESPIRATION RATE: 16 BRPM | HEIGHT: 60 IN | OXYGEN SATURATION: 99 % | HEART RATE: 83 BPM | TEMPERATURE: 98.6 F | WEIGHT: 149.8 LBS | SYSTOLIC BLOOD PRESSURE: 136 MMHG

## 2019-02-12 DIAGNOSIS — E78.5 HYPERLIPIDEMIA, UNSPECIFIED HYPERLIPIDEMIA TYPE: ICD-10-CM

## 2019-02-12 DIAGNOSIS — G47.33 OSA ON CPAP: ICD-10-CM

## 2019-02-12 DIAGNOSIS — Z99.89 OSA ON CPAP: ICD-10-CM

## 2019-02-12 DIAGNOSIS — I10 ESSENTIAL HYPERTENSION: Primary | ICD-10-CM

## 2019-02-12 DIAGNOSIS — R20.2 PARESTHESIA OF UPPER EXTREMITY: ICD-10-CM

## 2019-02-12 RX ORDER — LOSARTAN POTASSIUM 25 MG/1
25 TABLET ORAL DAILY
Qty: 90 TAB | Refills: 1 | Status: SHIPPED | OUTPATIENT
Start: 2019-02-12 | End: 2019-07-25 | Stop reason: SDUPTHER

## 2019-02-12 NOTE — PATIENT INSTRUCTIONS
Learning About CPAP for Sleep Apnea  What is CPAP? CPAP is a small machine that you use at home every night while you sleep. It increases air pressure in your throat to keep your airway open. When you have sleep apnea, this can help you sleep better so you feel much better. CPAP stands for \"continuous positive airway pressure. \"  The CPAP machine will have one of the following:  · A mask that covers your nose and mouth  · Prongs that fit into your nose  · A mask that covers your nose only, the most common type. This type is called NCPAP. The N stands for \"nasal.\"  Why is it done? CPAP is usually the best treatment for obstructive sleep apnea. It is the first treatment choice and the most widely used. Your doctor may suggest CPAP if you have:  · Moderate to severe sleep apnea. · Sleep apnea and coronary artery disease (CAD). · Sleep apnea and heart failure. How does it help? · CPAP can help you have more normal sleep, so you feel less sleepy and more alert during the daytime. · CPAP may help keep heart failure or other heart problems from getting worse. · CPAP may help lower your blood pressure. · If you use CPAP, your bed partner may also sleep better because you are not snoring or restless. What are the side effects? Some people who use CPAP have:  · A dry or stuffy nose and a sore throat. · Irritated skin on the face. · Sore eyes. · Bloating. If you have any of these problems, work with your doctor to fix them. Here are some things you can try:  · Be sure the mask or nasal prongs fit well. · See if your doctor can adjust the pressure of your CPAP. · If your nose is dry, try a humidifier. · If your nose is runny or stuffy, try decongestant medicine or a steroid nasal spray. Be safe with medicines. Read and follow all instructions on the label. Do not use the medicine longer than the label says. If these things do not help, you might try a different type of machine.  Some machines have air pressure that adjusts on its own. Others have air pressures that are different when you breathe in than when you breathe out. This may reduce discomfort caused by too much pressure in your nose. Where can you learn more? Go to http://kiera-tom.info/. Enter W217 in the search box to learn more about \"Learning About CPAP for Sleep Apnea. \"  Current as of: September 5, 2018  Content Version: 11.9  © 8771-2747 [employer]. Care instructions adapted under license by [employer] (which disclaims liability or warranty for this information). If you have questions about a medical condition or this instruction, always ask your healthcare professional. [place] [address] any warranty or liability for your use of this information.

## 2019-02-12 NOTE — PROGRESS NOTES
HISTORY OF PRESENT ILLNESS  Ankit Brooke is a 61 y.o. female. HPI  Ankit Brooke is a 61 y.o. female who presents to the office today for HTn  She is here for HTN follow up. At her last visit, lisinopril was changed to losartan. She has been tolerating this well. There are no more headaches. This is controlling her BP well. She now has a CPAP machine and says she is sleeping much better. SHe is ready to go back to work. She is not following up with Neurology for her paresthesia because she does not want surgery and says this is the next step. Chief Complaint   Patient presents with    Hypertension     follow up       Current Outpatient Medications on File Prior to Visit   Medication Sig Dispense Refill    simvastatin (ZOCOR) 20 mg tablet Take 1 Tab by mouth nightly. 180 Tab 1    aspirin delayed-release 81 mg tablet Take  by mouth daily.  b complex vitamins tablet Take 1 Tab by mouth daily. No current facility-administered medications on file prior to visit. Allergies   Allergen Reactions    Latex Rash     Only prolonged use     Past Medical History:   Diagnosis Date    Hyperlipidemia     Hypertension     Menopause      Social History     Tobacco Use   Smoking Status Never Smoker   Smokeless Tobacco Never Used     Social History     Substance and Sexual Activity   Alcohol Use Yes    Alcohol/week: 2.4 oz    Types: 4 Glasses of wine per week     No family history on file. Review of Systems   Constitutional: Negative for fever, malaise/fatigue and weight loss. Eyes: Negative for blurred vision and double vision. Respiratory: Negative for shortness of breath. Cardiovascular: Negative for chest pain and palpitations. Gastrointestinal: Negative for abdominal pain and nausea. Neurological: Positive for tingling. Negative for dizziness, focal weakness and headaches. Chronic and stable   Endo/Heme/Allergies: Does not bruise/bleed easily. Psychiatric/Behavioral: Negative for depression. The patient does not have insomnia. Visit Vitals  /75 (BP 1 Location: Left arm, BP Patient Position: Sitting)   Pulse 83   Temp 98.6 °F (37 °C) (Oral)   Resp 16   Ht 5' (1.524 m)   Wt 149 lb 12.8 oz (67.9 kg)   SpO2 99%   BMI 29.26 kg/m²     Physical Exam   Constitutional: She is oriented to person, place, and time. She appears well-developed and well-nourished. No distress. Cardiovascular: Normal rate, regular rhythm and normal heart sounds. Pulses:       Radial pulses are 2+ on the right side, and 2+ on the left side. Pulmonary/Chest: Effort normal and breath sounds normal. No respiratory distress. She has no wheezes. She has no rales. Musculoskeletal: She exhibits no edema. Neurological: She is alert and oriented to person, place, and time. Skin: Skin is warm and dry. Psychiatric: She has a normal mood and affect. Her behavior is normal. Thought content normal.   Nursing note and vitals reviewed. ASSESSMENT and PLAN    ICD-10-CM ICD-9-CM    1. Essential hypertension I10 401.9 losartan (COZAAR) 25 mg tablet   2. Paresthesia of upper extremity R20.2 782.0    3. NIDIA on CPAP G47.33 327.23     Z99.89 V46.8    4. Hyperlipidemia, unspecified hyperlipidemia type E78.5 272.4       BP well controlled. Continue losartan. HLD- on zocor. Will recheck fasting labs in 6 months. NIDIA- has been compliant with CPAP machine and feels well since starting. Paresthesias- follow up with Neuro as needed. She was previously cleared for duty by Neuro. I Completed Fit for Duty form today while she was int he office. She is anxious to get back to work. Reviewed medication and side effects. Patient agrees with the plan and verbalizes understanding. I have spent over 25 minutes in face to face time with this pt in discussion with respect to the aforementioned problems, diagnoses and management plans.  >50% of the time was spent counseling and coordinating care.     Follow-up Disposition:  Return in about 6 months (around 8/12/2019) for HTN, HLD.     Fabi Medina PA-C  2/12/2019

## 2019-03-21 DIAGNOSIS — I10 ESSENTIAL HYPERTENSION: ICD-10-CM

## 2019-03-21 RX ORDER — LOSARTAN POTASSIUM 25 MG/1
TABLET ORAL
Qty: 90 TAB | Refills: 2 | Status: SHIPPED | OUTPATIENT
Start: 2019-03-21

## 2019-07-25 DIAGNOSIS — I10 ESSENTIAL HYPERTENSION: ICD-10-CM

## 2019-07-25 DIAGNOSIS — E78.5 HYPERLIPIDEMIA, UNSPECIFIED HYPERLIPIDEMIA TYPE: ICD-10-CM

## 2019-07-25 NOTE — TELEPHONE ENCOUNTER
Patient is leaving on a 6 month deployment and is requesting to have a 6 month supply of this medications.     please assist

## 2019-07-29 RX ORDER — LOSARTAN POTASSIUM 25 MG/1
25 TABLET ORAL DAILY
Qty: 180 TAB | Refills: 1 | Status: SHIPPED | OUTPATIENT
Start: 2019-07-29 | End: 2022-04-26

## 2019-07-29 RX ORDER — SIMVASTATIN 20 MG/1
20 TABLET, FILM COATED ORAL
Qty: 180 TAB | Refills: 1 | Status: SHIPPED | OUTPATIENT
Start: 2019-07-29

## 2020-03-13 ENCOUNTER — HOSPITAL ENCOUNTER (OUTPATIENT)
Dept: MAMMOGRAPHY | Age: 65
Discharge: HOME | End: 2020-03-13
Payer: COMMERCIAL

## 2020-03-13 DIAGNOSIS — Z12.31 VISIT FOR SCREENING MAMMOGRAM: ICD-10-CM

## 2020-03-13 PROCEDURE — 77063 BREAST TOMOSYNTHESIS BI: CPT

## 2021-03-09 ENCOUNTER — TRANSCRIBE ORDER (OUTPATIENT)
Dept: SCHEDULING | Age: 66
End: 2021-03-09

## 2021-03-09 DIAGNOSIS — Z12.31 VISIT FOR SCREENING MAMMOGRAM: Primary | ICD-10-CM

## 2021-11-01 ENCOUNTER — HOSPITAL ENCOUNTER (OUTPATIENT)
Dept: WOMENS IMAGING | Age: 66
Discharge: HOME | End: 2021-11-01
Attending: OBSTETRICS & GYNECOLOGY
Payer: MEDICARE

## 2021-11-01 DIAGNOSIS — Z12.31 VISIT FOR SCREENING MAMMOGRAM: ICD-10-CM

## 2021-11-01 PROCEDURE — 77063 BREAST TOMOSYNTHESIS BI: CPT

## 2022-03-19 PROBLEM — E78.5 HLD (HYPERLIPIDEMIA): Status: ACTIVE | Noted: 2018-04-12

## 2022-03-19 PROBLEM — R20.2 PARESTHESIA OF UPPER EXTREMITY: Status: ACTIVE | Noted: 2019-01-15

## 2022-03-20 PROBLEM — I10 ESSENTIAL HYPERTENSION: Status: ACTIVE | Noted: 2018-04-12

## 2022-11-07 ENCOUNTER — HOSPITAL ENCOUNTER (OUTPATIENT)
Dept: WOMENS IMAGING | Age: 67
Discharge: HOME | End: 2022-11-07
Attending: OBSTETRICS & GYNECOLOGY
Payer: MEDICARE

## 2022-11-07 DIAGNOSIS — Z12.31 ENCOUNTER FOR SCREENING MAMMOGRAM FOR BREAST CANCER: ICD-10-CM

## 2022-11-07 PROCEDURE — 77063 BREAST TOMOSYNTHESIS BI: CPT

## 2023-11-21 ENCOUNTER — HOSPITAL ENCOUNTER (OUTPATIENT)
Dept: WOMENS IMAGING | Facility: HOSPITAL | Age: 68
Discharge: HOME | End: 2023-11-24
Payer: MEDICARE

## 2023-11-21 DIAGNOSIS — Z12.31 VISIT FOR SCREENING MAMMOGRAM: ICD-10-CM

## 2023-11-21 PROCEDURE — 77063 BREAST TOMOSYNTHESIS BI: CPT

## 2024-09-10 PROBLEM — M48.00 SPINAL STENOSIS: Status: ACTIVE | Noted: 2024-09-10

## 2024-11-07 ENCOUNTER — TRANSCRIBE ORDERS (OUTPATIENT)
Facility: HOSPITAL | Age: 69
End: 2024-11-07

## 2024-11-07 DIAGNOSIS — Z12.31 OTHER SCREENING MAMMOGRAM: Primary | ICD-10-CM

## 2024-12-06 ENCOUNTER — HOSPITAL ENCOUNTER (OUTPATIENT)
Dept: WOMENS IMAGING | Facility: HOSPITAL | Age: 69
Discharge: HOME | End: 2024-12-09
Payer: MEDICARE

## 2024-12-06 DIAGNOSIS — Z12.31 OTHER SCREENING MAMMOGRAM: ICD-10-CM

## 2024-12-06 PROCEDURE — 77063 BREAST TOMOSYNTHESIS BI: CPT
